# Patient Record
Sex: MALE | Race: BLACK OR AFRICAN AMERICAN | NOT HISPANIC OR LATINO | URBAN - METROPOLITAN AREA
[De-identification: names, ages, dates, MRNs, and addresses within clinical notes are randomized per-mention and may not be internally consistent; named-entity substitution may affect disease eponyms.]

---

## 2017-01-05 ENCOUNTER — INPATIENT (INPATIENT)
Facility: HOSPITAL | Age: 67
LOS: 1 days | Discharge: HOME | End: 2017-01-07
Attending: INTERNAL MEDICINE | Admitting: INTERNAL MEDICINE
Payer: MEDICAID

## 2017-01-05 VITALS
WEIGHT: 177.91 LBS | RESPIRATION RATE: 19 BRPM | SYSTOLIC BLOOD PRESSURE: 144 MMHG | OXYGEN SATURATION: 98 % | TEMPERATURE: 99 F | HEIGHT: 70 IN | DIASTOLIC BLOOD PRESSURE: 75 MMHG | HEART RATE: 88 BPM

## 2017-01-05 DIAGNOSIS — E87.70 FLUID OVERLOAD, UNSPECIFIED: ICD-10-CM

## 2017-01-05 DIAGNOSIS — C90.00 MULTIPLE MYELOMA NOT HAVING ACHIEVED REMISSION: ICD-10-CM

## 2017-01-05 DIAGNOSIS — R06.09 OTHER FORMS OF DYSPNEA: ICD-10-CM

## 2017-01-05 DIAGNOSIS — I42.9 CARDIOMYOPATHY, UNSPECIFIED: ICD-10-CM

## 2017-01-05 DIAGNOSIS — D64.9 ANEMIA, UNSPECIFIED: ICD-10-CM

## 2017-01-05 LAB
ACANTHOCYTES BLD QL SMEAR: SIGNIFICANT CHANGE UP
ALBUMIN SERPL ELPH-MCNC: 2.7 G/DL — LOW (ref 3.3–5)
ALP SERPL-CCNC: 135 U/L — HIGH (ref 40–120)
ALT FLD-CCNC: 11 U/L — LOW (ref 12–78)
ANION GAP SERPL CALC-SCNC: 5 MMOL/L — SIGNIFICANT CHANGE UP (ref 5–17)
ANISOCYTOSIS BLD QL: SIGNIFICANT CHANGE UP
APTT BLD: 36.7 SEC — SIGNIFICANT CHANGE UP (ref 27.5–37.4)
AST SERPL-CCNC: 7 U/L — LOW (ref 15–37)
BILIRUB SERPL-MCNC: 0.4 MG/DL — SIGNIFICANT CHANGE UP (ref 0.2–1.2)
BLD GP AB SCN SERPL QL: SIGNIFICANT CHANGE UP
BUN SERPL-MCNC: 18 MG/DL — SIGNIFICANT CHANGE UP (ref 7–23)
BURR CELLS BLD QL SMEAR: PRESENT — SIGNIFICANT CHANGE UP
CALCIUM SERPL-MCNC: 8.1 MG/DL — LOW (ref 8.5–10.1)
CHLORIDE SERPL-SCNC: 106 MMOL/L — SIGNIFICANT CHANGE UP (ref 96–108)
CK MB BLD-MCNC: 2.4 % — SIGNIFICANT CHANGE UP (ref 0–3.5)
CK MB CFR SERPL CALC: 1.4 NG/ML — SIGNIFICANT CHANGE UP (ref 0.5–3.6)
CK SERPL-CCNC: 59 U/L — SIGNIFICANT CHANGE UP (ref 26–308)
CO2 SERPL-SCNC: 31 MMOL/L — SIGNIFICANT CHANGE UP (ref 22–31)
CREAT SERPL-MCNC: 0.59 MG/DL — SIGNIFICANT CHANGE UP (ref 0.5–1.3)
D DIMER BLD IA.RAPID-MCNC: <150 NG/ML DDU — SIGNIFICANT CHANGE UP
DACRYOCYTES BLD QL SMEAR: SLIGHT — SIGNIFICANT CHANGE UP
ELLIPTOCYTES BLD QL SMEAR: SLIGHT — SIGNIFICANT CHANGE UP
GLUCOSE SERPL-MCNC: 93 MG/DL — SIGNIFICANT CHANGE UP (ref 70–99)
HCT VFR BLD CALC: 21.3 % — LOW (ref 39–50)
HGB BLD-MCNC: 7 G/DL — CRITICAL LOW (ref 13–17)
HYPERCHROMIA BLD QL AUTO: SIGNIFICANT CHANGE UP
HYPOCHROMIA BLD QL: SLIGHT — SIGNIFICANT CHANGE UP
INR BLD: 1.33 RATIO — HIGH (ref 0.88–1.16)
LYMPHOCYTES # BLD AUTO: 38 % — SIGNIFICANT CHANGE UP (ref 13–44)
MCHC RBC-ENTMCNC: 30.5 PG — SIGNIFICANT CHANGE UP (ref 27–34)
MCHC RBC-ENTMCNC: 33 GM/DL — SIGNIFICANT CHANGE UP (ref 32–36)
MCV RBC AUTO: 92.7 FL — SIGNIFICANT CHANGE UP (ref 80–100)
MICROCYTES BLD QL: SIGNIFICANT CHANGE UP
MONOCYTES NFR BLD AUTO: 6 % — SIGNIFICANT CHANGE UP (ref 2–14)
NEUTROPHILS NFR BLD AUTO: 54 % — SIGNIFICANT CHANGE UP (ref 43–77)
NEUTS BAND # BLD: 1 % — SIGNIFICANT CHANGE UP (ref 0–8)
NT-PROBNP SERPL-SCNC: 5281 PG/ML — HIGH (ref 0–125)
OVALOCYTES BLD QL SMEAR: SLIGHT — SIGNIFICANT CHANGE UP
PAPPENHEIMER BOD BLD QL SMEAR: PRESENT — SIGNIFICANT CHANGE UP
PLAT MORPH BLD: NORMAL — SIGNIFICANT CHANGE UP
PLATELET # BLD AUTO: 318 K/UL — SIGNIFICANT CHANGE UP (ref 150–400)
POIKILOCYTOSIS BLD QL AUTO: SIGNIFICANT CHANGE UP
POLYCHROMASIA BLD QL SMEAR: SLIGHT — SIGNIFICANT CHANGE UP
POTASSIUM SERPL-MCNC: 4.1 MMOL/L — SIGNIFICANT CHANGE UP (ref 3.5–5.3)
POTASSIUM SERPL-SCNC: 4.1 MMOL/L — SIGNIFICANT CHANGE UP (ref 3.5–5.3)
PROT SERPL-MCNC: 7.2 GM/DL — SIGNIFICANT CHANGE UP (ref 6–8.3)
PROTHROM AB SERPL-ACNC: 14.9 SEC — HIGH (ref 10–13.1)
RBC # BLD: 2.3 M/UL — LOW (ref 4.2–5.8)
RBC # FLD: 17 % — HIGH (ref 11–15)
RBC BLD AUTO: ABNORMAL
ROULEAUX BLD QL SMEAR: PRESENT — SIGNIFICANT CHANGE UP
SCHISTOCYTES BLD QL AUTO: SLIGHT — SIGNIFICANT CHANGE UP
SODIUM SERPL-SCNC: 142 MMOL/L — SIGNIFICANT CHANGE UP (ref 135–145)
TROPONIN I SERPL-MCNC: <.015 NG/ML — SIGNIFICANT CHANGE UP (ref 0.01–0.04)
VARIANT LYMPHS # BLD: 1 % — SIGNIFICANT CHANGE UP (ref 0–6)
WBC # BLD: 8.3 K/UL — SIGNIFICANT CHANGE UP (ref 3.8–10.5)
WBC # FLD AUTO: 8.3 K/UL — SIGNIFICANT CHANGE UP (ref 3.8–10.5)

## 2017-01-05 PROCEDURE — 93970 EXTREMITY STUDY: CPT | Mod: 26

## 2017-01-05 PROCEDURE — 99285 EMERGENCY DEPT VISIT HI MDM: CPT | Mod: 25

## 2017-01-05 PROCEDURE — 76700 US EXAM ABDOM COMPLETE: CPT | Mod: 26

## 2017-01-05 PROCEDURE — 71010: CPT | Mod: 26

## 2017-01-05 RX ORDER — NITROGLYCERIN 6.5 MG
1 CAPSULE, EXTENDED RELEASE ORAL ONCE
Qty: 0 | Refills: 0 | Status: COMPLETED | OUTPATIENT
Start: 2017-01-05 | End: 2017-01-05

## 2017-01-05 RX ORDER — LISINOPRIL 2.5 MG/1
10 TABLET ORAL DAILY
Qty: 0 | Refills: 0 | Status: DISCONTINUED | OUTPATIENT
Start: 2017-01-05 | End: 2017-01-07

## 2017-01-05 RX ORDER — ENOXAPARIN SODIUM 100 MG/ML
40 INJECTION SUBCUTANEOUS DAILY
Qty: 0 | Refills: 0 | Status: DISCONTINUED | OUTPATIENT
Start: 2017-01-05 | End: 2017-01-07

## 2017-01-05 RX ORDER — FUROSEMIDE 40 MG
60 TABLET ORAL ONCE
Qty: 0 | Refills: 0 | Status: COMPLETED | OUTPATIENT
Start: 2017-01-05 | End: 2017-01-05

## 2017-01-05 RX ORDER — FUROSEMIDE 40 MG
40 TABLET ORAL DAILY
Qty: 0 | Refills: 0 | Status: DISCONTINUED | OUTPATIENT
Start: 2017-01-05 | End: 2017-01-07

## 2017-01-05 RX ADMIN — Medication 60 MILLIGRAM(S): at 11:19

## 2017-01-05 RX ADMIN — Medication 1 INCH(S): at 11:19

## 2017-01-05 NOTE — ED ADULT TRIAGE NOTE - CHIEF COMPLAINT QUOTE
pt states, " I was sent by MD herr my oncologist , I am having sob, swollen feet and my blood count is low" pt states he has Multiple myeloma  was seen by oncologist yesterday and had chemo treatment yesterday

## 2017-01-05 NOTE — ED ADULT NURSE NOTE - OBJECTIVE STATEMENT
I am having shortness of breath , swollen feet and legs . He states  blood count is low" pt states he has Multiple myeloma  was seen by oncologist yesterday and had chemo treatment yesterday. Pt with SOB swelling to his feet and legs

## 2017-01-05 NOTE — ED PROVIDER NOTE - OBJECTIVE STATEMENT
66 year old male with h/o multiple myeloma presents today sent in by his oncologist for c/o dyspnea on exertion, abdominal distention and edema of his legs and feet bilaterally (-) chest pain (-) cough (-) fevers (-) recent travel (-) palpitations

## 2017-01-05 NOTE — CONSULT NOTE ADULT - SUBJECTIVE AND OBJECTIVE BOX
Reason for Consultation:     HPI: Patient is a 66y Male seen on consultation for the evaluation and management of Multiple Myeloma; Patient has history of light chain multiple myeloma, patient also has a history of Cardiomyopathy, patient coming in with B/L LE Swelling and Ascites, also has Hb of 7, patient feels SOB    PAST MEDICAL & SURGICAL HISTORY:      REVIEW OF SYSTEMS:    Weakness +, SOB      MEDICATIONS  (STANDING):  furosemide   Injectable 60milliGRAM(s) IV Push Once  nitroglycerin    2% Ointment 1Inch(s) Transdermal Once    MEDICATIONS  (PRN):      Allergies    No Known Allergies    Intolerances        SOCIAL HISTORY:    Smoking Status: non smoke  Alcohol: no alchol  Marital Status:   Occupation: Retired        Vital Signs Last 24 Hrs  T(C): 37.2, Max: 37.2 (01-05 @ 07:34)  T(F): 98.9, Max: 98.9 (01-05 @ 07:34)  HR: 88 (88 - 88)  BP: 144/75 (144/75 - 144/75)  BP(mean): --  RR: 19 (19 - 19)  SpO2: 98% (98% - 98%)    PHYSICAL EXAM:    general - AAO x 3  HEENt - No icterus, JVD +  CVS - RRR  RS - AE B/L  Abd - Distended  Ext - Pulses +      LABS:                        7.0    8.3   )-----------( 318      ( 05 Jan 2017 08:40 )             21.3     05 Jan 2017 08:40    142    |  106    |  18     ----------------------------<  93     4.1     |  31     |  0.59     Ca    8.1        05 Jan 2017 08:40    TPro  7.2    /  Alb  2.7    /  TBili  0.4    /  DBili  x      /  AST  7      /  ALT  11     /  AlkPhos  135    05 Jan 2017 08:40    PT/INR - ( 05 Jan 2017 08:40 )   PT: 14.9 sec;   INR: 1.33 ratio         PTT - ( 05 Jan 2017 08:40 )  PTT:36.7 sec

## 2017-01-05 NOTE — CONSULT NOTE ADULT - CONSULT REASON
MEDICAL RECORD REVIEWED; HISTORY AND  REVIEW OF SYSTEMS OBTAINED; PATIENT EXAMINED:  ANASARCA  IN THIS MICHELLE 66 MALE NATIVE OF [place],  PRESENTING WITH WORSENING BODY SWELLING AND DYSPNEA; HISTORY OF MULTIPLE MYELOMA DIAGNOSED THIS YEAR, RECENTLY DIAGNOSED "CARDIAC CONDITION" ? PAROXYSMAL ATRIAL FIBRILLATION /CARDIOMYOPATHY?. PRESENTLY FEELING BETTER WITH TRANSFUSIONS AND IV LASIX WITH BRISK URINE OUT PUT. TELEMETERY SHOW NORMAL SINUS RHYTHM AT PRESENT. ALL LABS, RADIOLOGY,ECG, TREATMENTS REVIEWED. ON EXAM: ELEVATED NECK VEINS AS WELL AS FORHEAD VEINS, PRECORDIAL HEAVE SUMMATION GALLOP HEPATOMEGALY/ASICTES AND EDEMA;     IMPRESSION:  ANASARCA WITH SUSPECTED CARDIAC COMPONENT; LOW ALBUMIN; AGREE WITH PRESENT MANAGEMENT ; WILL OBTAIN ECHO AND ECG, TELEMETRY MONITORING  AND WILL MAKE FURTHER RECCOMMENDATIONS PENDING HOSPITAL COURSE.    THANK YOU,    MILTON TAYLOR MD, FACC MEDICAL RECORD REVIEWED; HISTORY AND  REVIEW OF SYSTEMS OBTAINED; PATIENT EXAMINED:  ANASARCA  IN THIS MICHELLE 66 MALE NATIVE OF [place],  PRESENTING WITH WORSENING BODY SWELLING AND DYSPNEA; HISTORY OF MULTIPLE MYELOMA DIAGNOSED THIS YEAR, RECENTLY DIAGNOSED "CARDIAC CONDITION" ? PAROXYSMAL ATRIAL FIBRILLATION /CARDIOMYOPATHY?.  NO PRIOR HISTORY OF TB;  PRESENTLY FEELING BETTER WITH TRANSFUSIONS AND IV LASIX WITH BRISK URINE OUT PUT. TELEMETERY SHOW NORMAL SINUS RHYTHM AT PRESENT. ALL LABS, RADIOLOGY,ECG, TREATMENTS REVIEWED. ON EXAM: ELEVATED NECK VEINS AS WELL AS FORHEAD VEINS, PRECORDIAL HEAVE SUMMATION GALLOP HEPATOMEGALY/ASICTES AND EDEMA;     IMPRESSION:  ANASARCA WITH SUSPECTED CARDIAC COMPONENT; LOW ALBUMIN; AGREE WITH PRESENT MANAGEMENT ; WILL OBTAIN ECHO AND ECG, TELEMETRY MONITORING  AND WILL MAKE FURTHER RECCOMMENDATIONS PENDING HOSPITAL COURSE.    THANK YOU,    MILTON TAYLOR MD, [facility]

## 2017-01-05 NOTE — ED PROVIDER NOTE - MUSCULOSKELETAL, MLM
Spine appears normal, range of motion is not limited, no muscle or joint tenderness Spine appears normal, range of motion is not limited, no muscle or joint tenderness +bilateral leg and feel edema

## 2017-01-05 NOTE — ED ADULT NURSE REASSESSMENT NOTE - NS ED NURSE REASSESS COMMENT FT1
Remains in ultrasound confirmation tube for the blood transfusion pending draw
The packed red cell transfusion completed. The consent is on the chart the procedure was explained. No untoward effects and states he felt better after the transfusion.

## 2017-01-05 NOTE — H&P ADULT. - HISTORY OF PRESENT ILLNESS
sent By Dr Price for admission. patient is anemic , symptomatic with Dyspnea, fluid retention and hx of enlarged heart.   patient  got his first dose of chemo for MM yesterday.

## 2017-01-05 NOTE — ED PROVIDER NOTE - CARE PLAN
Principal Discharge DX:	Anemia  Secondary Diagnosis:	Cardiomyopathy  Secondary Diagnosis:	Fluid overload Secondary Diagnosis:	Fluid overload

## 2017-01-06 DIAGNOSIS — R16.2 HEPATOMEGALY WITH SPLENOMEGALY, NOT ELSEWHERE CLASSIFIED: ICD-10-CM

## 2017-01-06 LAB
ANION GAP SERPL CALC-SCNC: 8 MMOL/L — SIGNIFICANT CHANGE UP (ref 5–17)
BUN SERPL-MCNC: 14 MG/DL — SIGNIFICANT CHANGE UP (ref 7–23)
CALCIUM SERPL-MCNC: 8.2 MG/DL — LOW (ref 8.5–10.1)
CHLORIDE SERPL-SCNC: 105 MMOL/L — SIGNIFICANT CHANGE UP (ref 96–108)
CO2 SERPL-SCNC: 31 MMOL/L — SIGNIFICANT CHANGE UP (ref 22–31)
CREAT SERPL-MCNC: 0.63 MG/DL — SIGNIFICANT CHANGE UP (ref 0.5–1.3)
GLUCOSE SERPL-MCNC: 87 MG/DL — SIGNIFICANT CHANGE UP (ref 70–99)
HAV IGM SER-ACNC: SIGNIFICANT CHANGE UP
HBV CORE IGM SER-ACNC: SIGNIFICANT CHANGE UP
HBV SURFACE AG SER-ACNC: SIGNIFICANT CHANGE UP
HCT VFR BLD CALC: 23.2 % — LOW (ref 39–50)
HCV AB S/CO SERPL IA: 0.07 S/CO — SIGNIFICANT CHANGE UP
HCV AB SERPL-IMP: SIGNIFICANT CHANGE UP
HGB BLD-MCNC: 7.8 G/DL — LOW (ref 13–17)
MCHC RBC-ENTMCNC: 31.2 PG — SIGNIFICANT CHANGE UP (ref 27–34)
MCHC RBC-ENTMCNC: 33.5 GM/DL — SIGNIFICANT CHANGE UP (ref 32–36)
MCV RBC AUTO: 93 FL — SIGNIFICANT CHANGE UP (ref 80–100)
PLATELET # BLD AUTO: 295 K/UL — SIGNIFICANT CHANGE UP (ref 150–400)
POTASSIUM SERPL-MCNC: 3.9 MMOL/L — SIGNIFICANT CHANGE UP (ref 3.5–5.3)
POTASSIUM SERPL-SCNC: 3.9 MMOL/L — SIGNIFICANT CHANGE UP (ref 3.5–5.3)
RBC # BLD: 2.5 M/UL — LOW (ref 4.2–5.8)
RBC # FLD: 17.2 % — HIGH (ref 11–15)
SODIUM SERPL-SCNC: 144 MMOL/L — SIGNIFICANT CHANGE UP (ref 135–145)
WBC # BLD: 7.3 K/UL — SIGNIFICANT CHANGE UP (ref 3.8–10.5)
WBC # FLD AUTO: 7.3 K/UL — SIGNIFICANT CHANGE UP (ref 3.8–10.5)

## 2017-01-06 RX ORDER — CARVEDILOL PHOSPHATE 80 MG/1
3.12 CAPSULE, EXTENDED RELEASE ORAL EVERY 12 HOURS
Qty: 0 | Refills: 0 | Status: DISCONTINUED | OUTPATIENT
Start: 2017-01-06 | End: 2017-01-07

## 2017-01-06 RX ADMIN — Medication 1 INCH(S): at 00:00

## 2017-01-06 RX ADMIN — LISINOPRIL 10 MILLIGRAM(S): 2.5 TABLET ORAL at 05:29

## 2017-01-06 RX ADMIN — Medication 40 MILLIGRAM(S): at 05:29

## 2017-01-06 NOTE — DISCHARGE NOTE ADULT - PLAN OF CARE
kartik receved 1 unit of PRBc. . will follow up with hematologist for  procrit improved after diuresis corrected under chemo. monitor, cardiology follow up dash diet

## 2017-01-06 NOTE — PROGRESS NOTE ADULT - SUBJECTIVE AND OBJECTIVE BOX
Patient is a 66y old  Male who presents with a chief complaint of dyspnea, anemia, Multiple myeloma stage 3, cardiomyopathy (05 Jan 2017 12:56)  and fliud overload    INTERVAL HPI/OVERNIGHT EVENTS: patient is diuresing well. feels better. HB is 7.8    MEDICATIONS  (STANDING):  furosemide   Injectable 40milliGRAM(s) IV Push daily  enoxaparin Injectable 40milliGRAM(s) SubCutaneous daily  lisinopril 10milliGRAM(s) Oral daily    MEDICATIONS  (PRN):      Allergies    No Known Allergies    Intolerances        REVIEW OF SYSTEMS:  CONSTITUTIONAL: No fever, weight loss, or fatigue  EYES: No eye pain, visual disturbances, or discharge  ENMT:  No difficulty hearing, tinnitus, vertigo; No sinus or throat pain  NECK: No pain or stiffness  BREASTS: No pain, masses, or nipple discharge  RESPIRATORY: No cough, wheezing, chills or hemoptysis; No shortness of breath  CARDIOVASCULAR: No chest pain, palpitations, dizziness, or leg swelling  GASTROINTESTINAL: No abdominal or epigastric pain. No nausea, vomiting, or hematemesis; No diarrhea or constipation. No melena or hematochezia.  GENITOURINARY: No dysuria, frequency, hematuria, or incontinence  NEUROLOGICAL: No headaches, memory loss, loss of strength, numbness, or tremors  SKIN: No itching, burning, rashes, or lesions   LYMPH NODES: No enlarged glands  ENDOCRINE: No heat or cold intolerance; No hair loss  MUSCULOSKELETAL: No joint pain or swelling; No muscle, back, or extremity pain  PSYCHIATRIC: No depression, anxiety, mood swings, or difficulty sleeping  HEME/LYMPH: No easy bruising, or bleeding gums  ALLERY AND IMMUNOLOGIC: No hives or eczema    Vital Signs Last 24 Hrs  T(C): 36.8, Max: 36.9 (01-05 @ 18:34)  T(F): 98.2, Max: 98.4 (01-05 @ 18:34)  HR: 76 (76 - 87)  BP: 155/89 (134/76 - 155/89)  BP(mean): --  RR: 18 (16 - 19)  SpO2: 96% (95% - 100%)    PHYSICAL EXAM:  GENERAL: NAD, well-groomed, well-developed  HEAD:  Atraumatic, Normocephalic  EYES: EOMI, PERRLA, conjunctiva and sclera clear  ENMT: No tonsillar erythema, exudates, or enlargement; Moist mucous membranes, Good dentition, No lesions  NECK: Supple, No JVD, Normal thyroid  NERVOUS SYSTEM:  Alert & Oriented X3, Good concentration; Motor Strength 5/5 B/L upper and lower extremities; DTRs 2+ intact and symmetric  CHEST/LUNG: Clear to percussion bilaterally; No rales, rhonchi, wheezing, or rubs  HEART: Regular rate and rhythm; No murmurs, rubs, or gallops  ABDOMEN: Soft, Nontender, Nondistended; Bowel sounds present  EXTREMITIES:  2+ Peripheral Pulses, No clubbing, cyanosis, or edema  LYMPH: No lymphadenopathy noted  SKIN: No rashes or lesions    LABS:                        7.8    7.3   )-----------( 295      ( 06 Jan 2017 06:17 )             23.2     06 Jan 2017 06:17    144    |  105    |  14     ----------------------------<  87     3.9     |  31     |  0.63     Ca    8.2        06 Jan 2017 06:17    TPro  7.2    /  Alb  2.7    /  TBili  0.4    /  DBili  x      /  AST  7      /  ALT  11     /  AlkPhos  135    05 Jan 2017 08:40      PT/INR - ( 05 Jan 2017 08:40 )   PT: 14.9 sec;   INR: 1.33 ratio         PTT - ( 05 Jan 2017 08:40 )  PTT:36.7 sec    CAPILLARY BLOOD GLUCOSE      CARDIAC MARKERS ( 05 Jan 2017 08:40 )  <.015 ng/mL / x     / 59 U/L / x     / 1.4 ng/mL            RADIOLOGY & ADDITIONAL TESTS:    Imaging Personally Reviewed:  [ ] YES  [ ] NO    Consultant(s) Notes Reviewed:  [ ] YES  [ ] NO    Care Discussed with Consultants/Other Providers [ ] YES  [ ] NO    Care discussed with family,         [  ]   yes  [  ]  No    imp:    stable[x ]    unstable[  ]     improving [   ]       unchanged  [  ]                Plans:  Continue present plans  [ x ] discontinue telemetry               New consult [  ]   specialty  .......               order tootie[  ]    test name.                  Discharge Planning  [  ]

## 2017-01-06 NOTE — DISCHARGE NOTE ADULT - CARE PLAN
Principal Discharge DX:	Anemia  Goal:	paient receved 1 unit of PRBc. . will follow up with hematologist for  procrit  Secondary Diagnosis:	Dyspnea on exertion  Goal:	improved after diuresis  Secondary Diagnosis:	Fluid overload  Goal:	corrected  Secondary Diagnosis:	Multiple myeloma, stage 3  Goal:	under chemo.  Secondary Diagnosis:	Cardiomyopathy  Goal:	monitor, cardiology follow up Principal Discharge DX:	Anemia  Goal:	paient receved 1 unit of PRBc. . will follow up with hematologist for  procrit  Instructions for follow-up, activity and diet:	dash diet  Secondary Diagnosis:	Dyspnea on exertion  Goal:	improved after diuresis  Secondary Diagnosis:	Fluid overload  Goal:	corrected  Secondary Diagnosis:	Multiple myeloma, stage 3  Goal:	under chemo.  Secondary Diagnosis:	Cardiomyopathy  Goal:	monitor, cardiology follow up

## 2017-01-06 NOTE — PHYSICAL THERAPY INITIAL EVALUATION ADULT - CRITERIA FOR SKILLED THERAPEUTIC INTERVENTIONS
Pt is I c good balance and endurance in all functional ability, therefore d/c pt from the PT program.  If any further needs arise, please reconsult.

## 2017-01-06 NOTE — DISCHARGE NOTE ADULT - MEDICATION SUMMARY - MEDICATIONS TO TAKE
I will START or STAY ON the medications listed below when I get home from the hospital:    lisinopril 10 mg oral tablet  -- 1 tab(s) by mouth once a day  -- Indication: For Diastolic chf    carvedilol 3.125 mg oral tablet  -- 1 tab(s) by mouth every 12 hours  -- Indication: For Cardiomyopathy    Lasix 40 mg oral tablet  -- 1 tab(s) by mouth once a day  -- Indication: For Cardiomyopathy    potassium chloride 20 mEq oral tablet, extended release  -- 1 tab(s) by mouth 2 times a day  -- Indication: For supplement

## 2017-01-06 NOTE — PROGRESS NOTE ADULT - SUBJECTIVE AND OBJECTIVE BOX
PATIENT VOIDING WELL AND FEELING & BREATHING BETTER; ECHO REVIEWED; DILATED AND HYPERTROPHIC LV WITH NORMAL EF AND DIASTOLIC RELAXTION ABNORMALITY CONSISTENT WITH GRADE II DIASTOLIC DYSFUNCTION; MYOCARDIUM IS HYPERECHOIC WHICH SUGGESTS INFILTRATIVE  (IE? AMYLOID FROM MYELOMA) DEPOSITION; SURFACE ECG SPEAKS AGAINST AMYLOID AS LVH PATTERN AND NO LOW VOLTAGES; ON REVIEW OF ULTRASOUND, NO ASCITES DESCRIBED AND AS SUCH, NO ANASARCA AS I ORIGINALLY THOUGHT.  I DISCUSSED POSSIBLE TISSUE DIAGNOSIS TO PROVE ETIOLOGY BUT I DO NOT THINK THIS IS JUSTIFIED IN HIS CASE AS NOT LIKELY TO  OF UNDERLYING MULTIPLE MYELOMA WITH ANEMIA; AS SUCH WILL CONTINUE MANAGEMENT OF CARDIOMYOPATHY, POSSIBLE RESTRICTIVE/ DIASTOLIC/ INFILTRATIVE-PRESUMED AMYLOID INDUCED. CHANGE TO LASIX 40 MG DAILY WHEN IV SCHEDULE COMPLETE; PATIENT ON ACEI; ADD COREG AT LOW DOSE; UPON DISCHARGE, OFFICE FOLLOW UP WITH ME WITHIN ONE WEEK. ALL DISCUSSED WITH SISTER AND PATIENT;     MILTON TAYLOR MD, FACC

## 2017-01-06 NOTE — DISCHARGE NOTE ADULT - HOSPITAL COURSE
patient presented with fluid overload, he has a hx of dilate heart and Multiple myeloma . He had fluid overload and was diuresed with Iv lasix. he improved. his renal function is stable.

## 2017-01-06 NOTE — DISCHARGE NOTE ADULT - PROVIDER TOKENS
TOKEN:'[id number]',TOKEN:'[id number]',FREE:[LAST:[Dr Veronica MD],PHONE:[phone]],FAX:[(   [phone]    -],ADDRESS:[[address] [place] [address]]]

## 2017-01-06 NOTE — DISCHARGE NOTE ADULT - CARE PROVIDER_API CALL
Kody Price), Hematology; Internal Medicine; Medical Oncology  [address] [facility] [address]  [place] [address]  Phone: [phone]  Fax: [phone]    Kenroy Mcneil), Cardiology; Internal Medicine; Nuclear Cardiology  [address] [place] [address]  Phone: [phone]  Fax: [phone]    Dr Veronica MD,   [address] [address]  Phone: [phone]  Fax: (   )    -

## 2017-01-06 NOTE — PHYSICAL THERAPY INITIAL EVALUATION ADULT - MODIFIED CLINICAL TEST OF SENSORY INTEGRATION IN BALANCE TEST
Barthel Index: Feeding Score _10__, Bathing Score __5_, Grooming Score _5__, Dressing Score _10__, Bowels Score __10_, Bladder Score _10__, Toilet Score _10__, Transfers Score _15__, Mobility Score _15__, Stairs Score __10_,     Total Score __100_

## 2017-01-06 NOTE — PROGRESS NOTE ADULT - SUBJECTIVE AND OBJECTIVE BOX
INTERVAL HISTORY:  Dyspnea.    Allergies    No Known Allergies    Intolerances        MEDICATIONS  (STANDING):  furosemide   Injectable 40milliGRAM(s) IV Push daily  enoxaparin Injectable 40milliGRAM(s) SubCutaneous daily  lisinopril 10milliGRAM(s) Oral daily    MEDICATIONS  (PRN):      Vital Signs Last 24 Hrs  T(C): 36.8, Max: 36.9 (01-05 @ 18:34)  T(F): 98.2, Max: 98.4 (01-05 @ 18:34)  HR: 76 (76 - 87)  BP: 155/89 (134/76 - 155/89)  BP(mean): --  RR: 18 (16 - 19)  SpO2: 96% (95% - 100%)    PHYSICAL EXAM:    GENERAL: NAD,   HEAD:  Atraumatic, Normocephalic  EYES: EOMI, PERRLA, conjunctiva and sclera clear    NECK: Supple, No JVD, Normal thyroid    CHEST/LUNG: Clear to percussion bilaterally; No rales, rhonchi,   HEART: Regular rate and rhythm;   ABDOMEN: Soft, Nontender.  EXTREMITIES:   edema:- 1+  LYMPH: No lymphadenopathy noted        LABS:                        7.8    7.3   )-----------( 295      ( 06 Jan 2017 06:17 )             23.2     06 Jan 2017 06:17    144    |  105    |  14     ----------------------------<  87     3.9     |  31     |  0.63     Ca    8.2        06 Jan 2017 06:17    TPro  7.2    /  Alb  2.7    /  TBili  0.4    /  DBili  x      /  AST  7      /  ALT  11     /  AlkPhos  135    05 Jan 2017 08:40    PT/INR - ( 05 Jan 2017 08:40 )   PT: 14.9 sec;   INR: 1.33 ratio         PTT - ( 05 Jan 2017 08:40 )  PTT:36.7 sec        RADIOLOGY & ADDITIONAL STUDIES:    PATHOLOGY:

## 2017-01-06 NOTE — DISCHARGE NOTE ADULT - CARE PROVIDERS DIRECT ADDRESSES
,DirectAddress_Unknown,DirectAddress_Unknown,DirectAddress_Unknown,[email or web address]

## 2017-01-07 VITALS
RESPIRATION RATE: 18 BRPM | HEART RATE: 79 BPM | SYSTOLIC BLOOD PRESSURE: 126 MMHG | OXYGEN SATURATION: 96 % | TEMPERATURE: 97 F | DIASTOLIC BLOOD PRESSURE: 72 MMHG

## 2017-01-07 LAB
ANION GAP SERPL CALC-SCNC: 8 MMOL/L — SIGNIFICANT CHANGE UP (ref 5–17)
BUN SERPL-MCNC: 11 MG/DL — SIGNIFICANT CHANGE UP (ref 7–23)
CALCIUM SERPL-MCNC: 8.1 MG/DL — LOW (ref 8.5–10.1)
CHLORIDE SERPL-SCNC: 104 MMOL/L — SIGNIFICANT CHANGE UP (ref 96–108)
CO2 SERPL-SCNC: 32 MMOL/L — HIGH (ref 22–31)
CREAT SERPL-MCNC: 0.67 MG/DL — SIGNIFICANT CHANGE UP (ref 0.5–1.3)
GLUCOSE SERPL-MCNC: 149 MG/DL — HIGH (ref 70–99)
HCT VFR BLD CALC: 23.8 % — LOW (ref 39–50)
HGB BLD-MCNC: 7.3 G/DL — LOW (ref 13–17)
MCHC RBC-ENTMCNC: 28.4 PG — SIGNIFICANT CHANGE UP (ref 27–34)
MCHC RBC-ENTMCNC: 30.8 GM/DL — LOW (ref 32–36)
MCV RBC AUTO: 92 FL — SIGNIFICANT CHANGE UP (ref 80–100)
PLATELET # BLD AUTO: 316 K/UL — SIGNIFICANT CHANGE UP (ref 150–400)
POTASSIUM SERPL-MCNC: 3.4 MMOL/L — LOW (ref 3.5–5.3)
POTASSIUM SERPL-SCNC: 3.4 MMOL/L — LOW (ref 3.5–5.3)
RBC # BLD: 2.59 M/UL — LOW (ref 4.2–5.8)
RBC # FLD: 16.2 % — HIGH (ref 11–15)
SODIUM SERPL-SCNC: 144 MMOL/L — SIGNIFICANT CHANGE UP (ref 135–145)
WBC # BLD: 6.7 K/UL — SIGNIFICANT CHANGE UP (ref 3.8–10.5)
WBC # FLD AUTO: 6.7 K/UL — SIGNIFICANT CHANGE UP (ref 3.8–10.5)

## 2017-01-07 RX ORDER — FUROSEMIDE 40 MG
1 TABLET ORAL
Qty: 30 | Refills: 0 | OUTPATIENT
Start: 2017-01-07 | End: 2017-02-06

## 2017-01-07 RX ORDER — POTASSIUM CHLORIDE 20 MEQ
1 PACKET (EA) ORAL
Qty: 60 | Refills: 0 | OUTPATIENT
Start: 2017-01-07 | End: 2017-02-06

## 2017-01-07 RX ORDER — LISINOPRIL 2.5 MG/1
1 TABLET ORAL
Qty: 30 | Refills: 0 | OUTPATIENT
Start: 2017-01-07 | End: 2017-02-06

## 2017-01-07 RX ORDER — CARVEDILOL PHOSPHATE 80 MG/1
1 CAPSULE, EXTENDED RELEASE ORAL
Qty: 60 | Refills: 0 | OUTPATIENT
Start: 2017-01-07 | End: 2017-02-06

## 2017-01-07 RX ADMIN — Medication 40 MILLIGRAM(S): at 05:42

## 2017-01-07 RX ADMIN — CARVEDILOL PHOSPHATE 3.12 MILLIGRAM(S): 80 CAPSULE, EXTENDED RELEASE ORAL at 00:33

## 2017-01-07 RX ADMIN — LISINOPRIL 10 MILLIGRAM(S): 2.5 TABLET ORAL at 05:42

## 2017-01-07 RX ADMIN — CARVEDILOL PHOSPHATE 3.12 MILLIGRAM(S): 80 CAPSULE, EXTENDED RELEASE ORAL at 05:42

## 2017-01-07 NOTE — PROGRESS NOTE ADULT - SUBJECTIVE AND OBJECTIVE BOX
INTERVAL HISTORY:  Less leg Swelling.  Abdominal Fullness.      Allergies    No Known Allergies    Intolerances        MEDICATIONS  (STANDING):  furosemide   Injectable 40milliGRAM(s) IV Push daily  enoxaparin Injectable 40milliGRAM(s) SubCutaneous daily  lisinopril 10milliGRAM(s) Oral daily  carvedilol 3.125milliGRAM(s) Oral every 12 hours    MEDICATIONS  (PRN):      Vital Signs Last 24 Hrs  T(C): 36.4, Max: 36.8 (01-07 @ 00:37)  T(F): 97.6, Max: 98.2 (01-07 @ 00:37)  HR: 73 (73 - 89)  BP: 143/87 (143/87 - 153/85)  BP(mean): --  RR: 18 (18 - 18)  SpO2: 97% (97% - 100%)    PHYSICAL EXAM:    GENERAL: NAD,   HEAD:  Atraumatic, Normocephalic  EYES: EOMI, PERRLA, conjunctiva and sclera clear    NECK: Supple, No JVD, Normal thyroid    CHEST/LUNG: Clear to percussion bilaterally; No rales, rhonchi,   HEART: Regular rate and rhythm;   ABDOMEN :Distension.  EXTREMITIES:   edema:- 1+  LYMPH: No lymphadenopathy noted        LABS:                        7.3    6.7   )-----------( 316      ( 07 Jan 2017 10:19 )             23.8     07 Jan 2017 10:19    144    |  104    |  11     ----------------------------<  149    3.4     |  32     |  0.67     Ca    8.1        07 Jan 2017 10:19              RADIOLOGY & ADDITIONAL STUDIES:    PATHOLOGY:

## 2017-01-07 NOTE — PROGRESS NOTE ADULT - SUBJECTIVE AND OBJECTIVE BOX
FEELS AND LOOKS BETTER  IMPROVED JUGULAR VENOUS CONTOURS  GOOD AIR ENTRY OVERALL WITH FEINT CRACKLES AT LEFT BASE  REGULAR RATE_&_RHYTHM;NORMAL_S1&S2_NO_SIGNIFICANT_MURMURS,RUBS,OR_GALLOPS.  ABDOMEN SOFT  NO APPRECIABLE EDEMA    STABLE CARDIOVASCULAR STATUS; CONTINUING WITH PRESENT MANAGEMENT.  CONTINUING PRESENT TREATMENT FOR CARDIOMYOPATHY, PRESUMED AMYLOID BASED; ON ACEI, BETA BLOCKER AND DIURETIC; OFFICE FOLLOW UP ONE WEEK AFTER DISCHARGE    MILTON TAYLOR MD, FACC

## 2017-01-11 DIAGNOSIS — Z87.891 PERSONAL HISTORY OF NICOTINE DEPENDENCE: ICD-10-CM

## 2017-01-11 DIAGNOSIS — I42.9 CARDIOMYOPATHY, UNSPECIFIED: ICD-10-CM

## 2017-01-11 DIAGNOSIS — D64.9 ANEMIA, UNSPECIFIED: ICD-10-CM

## 2017-01-11 DIAGNOSIS — I50.9 HEART FAILURE, UNSPECIFIED: ICD-10-CM

## 2017-01-11 DIAGNOSIS — I50.30 UNSPECIFIED DIASTOLIC (CONGESTIVE) HEART FAILURE: ICD-10-CM

## 2017-01-11 DIAGNOSIS — C90.00 MULTIPLE MYELOMA NOT HAVING ACHIEVED REMISSION: ICD-10-CM

## 2017-01-11 DIAGNOSIS — K76.6 PORTAL HYPERTENSION: ICD-10-CM

## 2017-01-11 DIAGNOSIS — R16.2 HEPATOMEGALY WITH SPLENOMEGALY, NOT ELSEWHERE CLASSIFIED: ICD-10-CM

## 2017-01-11 DIAGNOSIS — R18.8 OTHER ASCITES: ICD-10-CM

## 2017-01-11 DIAGNOSIS — E87.70 FLUID OVERLOAD, UNSPECIFIED: ICD-10-CM

## 2020-12-14 NOTE — ED PROVIDER NOTE - NORMAL, MLM
"Chief Complaint   Patient presents with   • Chest Pain     Starting yesterday, L sided, radiates to back, described as heavy, intermittent, worse with coughing, denies N/V or diaphoresis   • Shortness of Breath     Starting yesterday, denies viral sx, denies respiratory hx, SOB at rest     Pt arrives by self, ambulatory at triage, VSS on RA with tachycardia, no distress.    Pt returned to [facility]. Educated on triage process and to inform staff of any changes.     Denies all s/sx of covid, denies recent travel, denies fevers.    /75   Pulse (!) 114   Temp 36.6 °C (97.9 °F) (Temporal)   Resp 16   Ht 1.626 m (5' 4\")   Wt 60.2 kg (132 lb 11.5 oz)   SpO2 96%   BMI 22.78 kg/m²   "
mehdi all pertinent systems normal

## 2021-03-16 NOTE — DISCHARGE NOTE ADULT - PATIENT PORTAL LINK FT
“You can access the [employer] Patient Portal, offered by [facility], by registering with the following website: [email or web address]” complains of pain/discomfort

## 2022-09-16 NOTE — PATIENT PROFILE ADULT. - AS SC BRADEN FRICTION
A high fiber diet with plenty of fluids (up to 8 glasses of water daily) is suggested to relieve these symptoms.  Metamucil, 1 tablespoon once or twice daily can be used to keep bowels regular if needed.   
(3) no apparent problem

## 2025-04-29 NOTE — PATIENT PROFILE ADULT. - NS PRO AD NO ADVANCE DIRECTIVE
Arrives from  with concerns for pancreatitis. C/o mid upper abd pain since 0600 this morning. Denies N/V/D. Denies CP or SOB.   
No